# Patient Record
Sex: MALE | Race: ASIAN | NOT HISPANIC OR LATINO | URBAN - METROPOLITAN AREA
[De-identification: names, ages, dates, MRNs, and addresses within clinical notes are randomized per-mention and may not be internally consistent; named-entity substitution may affect disease eponyms.]

---

## 2024-10-23 ENCOUNTER — EMERGENCY (EMERGENCY)
Facility: HOSPITAL | Age: 34
LOS: 0 days | Discharge: HOME | End: 2024-10-24
Attending: EMERGENCY MEDICINE
Payer: COMMERCIAL

## 2024-10-23 VITALS
TEMPERATURE: 98 F | RESPIRATION RATE: 20 BRPM | OXYGEN SATURATION: 100 % | HEART RATE: 73 BPM | WEIGHT: 185.85 LBS | DIASTOLIC BLOOD PRESSURE: 61 MMHG | SYSTOLIC BLOOD PRESSURE: 110 MMHG

## 2024-10-23 DIAGNOSIS — R09.A2 FOREIGN BODY SENSATION, THROAT: ICD-10-CM

## 2024-10-23 PROCEDURE — 70490 CT SOFT TISSUE NECK W/O DYE: CPT | Mod: MC

## 2024-10-23 PROCEDURE — 99284 EMERGENCY DEPT VISIT MOD MDM: CPT | Mod: 25

## 2024-10-23 PROCEDURE — 70360 X-RAY EXAM OF NECK: CPT

## 2024-10-23 PROCEDURE — 99284 EMERGENCY DEPT VISIT MOD MDM: CPT

## 2024-10-23 PROCEDURE — 70360 X-RAY EXAM OF NECK: CPT | Mod: 26

## 2024-10-23 PROCEDURE — 70490 CT SOFT TISSUE NECK W/O DYE: CPT | Mod: 26,MC

## 2024-10-23 NOTE — ED STATDOCS - NSFOLLOWUPINSTRUCTIONS_ED_ALL_ED_FT
you have no foreign body in your throat    eat soft , non spicy foods    you may have an abrasion in your throat from a foreign body    tylenol for pain

## 2024-10-23 NOTE — ED STATDOCS - OBJECTIVE STATEMENT
33 y/o M with no pertinent PMHx presents to the ED c/o fish bone stuck in his throat. States he ate at 7:30pm and got a fish bone stuck in his throat. States when he swallows it feels like the bone is poking his L tonsil. States he thought he felt the bone with his finger, but is unsure completely where it is. States he was seen at [facility] and they did not see anything when they examined him. Did not have an XR at that time.

## 2024-10-23 NOTE — ED STATDOCS - PHYSICAL EXAMINATION
Gen:  Well appearing in NAD  Head:  NC/AT  Mouth: no posterior pharyngeal swelling, erythema, or foreign body appreciated   Heart: RRR, S1S2, no murmur  Resp: No distress, CTA   MSK: no deformities, Capillary refill <2sec, pulses +2 palp  Skin: warm and dry as visualized, no rash  Psych: AAO x 4, cooperative, mood congruent with situation

## 2024-10-23 NOTE — ED ADULT TRIAGE NOTE - CHIEF COMPLAINT QUOTE
pt ambulatory to triage from  for c/o fish bone stuck in throat. pt is speaking in full and complete sentences without difficulty. no respiratory distress. well appearing.

## 2024-10-23 NOTE — ED STATDOCS - PATIENT PORTAL LINK FT
You can access the FollowMyHealth Patient Portal offered by [facility] by registering at the following website: [email or web address]. By joining [facility]’s FollowMyHealth portal, you will also be able to view your health information using other applications (apps) compatible with our system.